# Patient Record
Sex: FEMALE | Race: WHITE | URBAN - METROPOLITAN AREA
[De-identification: names, ages, dates, MRNs, and addresses within clinical notes are randomized per-mention and may not be internally consistent; named-entity substitution may affect disease eponyms.]

---

## 2020-03-30 ENCOUNTER — HOSPITAL ENCOUNTER (OUTPATIENT)
Dept: MEDSURG UNIT | Facility: HOSPITAL | Age: 30
End: 2020-03-31
Attending: INTERNAL MEDICINE | Admitting: INTERNAL MEDICINE

## 2020-03-30 LAB
ABS NEUT (OLG): 5.2 X10(3)/MCL (ref 1.5–6.9)
ALBUMIN SERPL-MCNC: 4.2 GM/DL (ref 3.4–5)
ALBUMIN/GLOB SERPL: 1.2 RATIO
ALP SERPL-CCNC: 68 UNIT/L (ref 30–113)
ALT SERPL-CCNC: 25 UNIT/L (ref 10–45)
AMPHET UR QL SCN: NORMAL
APAP SERPL-MCNC: 0 MCG/ML (ref 10–30)
APPEARANCE, UA: CLEAR
AST SERPL-CCNC: 13 UNIT/L (ref 15–37)
B-HCG SERPL QL: NEGATIVE
BARBITURATE SCN PRESENT UR: NORMAL
BASOPHILS # BLD AUTO: 0 X10(3)/MCL (ref 0–0.1)
BASOPHILS NFR BLD AUTO: 0 % (ref 0–1)
BENZODIAZ UR QL SCN: NORMAL
BILIRUB SERPL-MCNC: 0.5 MG/DL (ref 0.1–0.9)
BILIRUB UR QL STRIP: NEGATIVE
BILIRUBIN DIRECT+TOT PNL SERPL-MCNC: 0.2 MG/DL (ref 0–0.3)
BILIRUBIN DIRECT+TOT PNL SERPL-MCNC: 0.3 MG/DL
BUN SERPL-MCNC: 8 MG/DL (ref 10–20)
CALCIUM SERPL-MCNC: 8.5 MG/DL (ref 8–10.5)
CANNABINOIDS UR QL SCN: NORMAL
CHLORIDE SERPL-SCNC: 106 MMOL/L (ref 100–108)
CO2 SERPL-SCNC: 28 MMOL/L (ref 21–35)
COCAINE UR QL SCN: NORMAL
COLOR UR: YELLOW
CREAT SERPL-MCNC: 0.92 MG/DL (ref 0.7–1.3)
EOSINOPHIL # BLD AUTO: 0.1 X10(3)/MCL (ref 0–0.6)
EOSINOPHIL NFR BLD AUTO: 1 % (ref 0–5)
ERYTHROCYTE [DISTWIDTH] IN BLOOD BY AUTOMATED COUNT: 12.8 % (ref 11.5–17)
ETHANOL SERPL-MCNC: 252 MG/DL (ref 0–10)
GLOBULIN SER-MCNC: 3.4 GM/DL
GLUCOSE (UA): NEGATIVE
GLUCOSE SERPL-MCNC: 92 MG/DL (ref 75–116)
HCT VFR BLD AUTO: 45.2 % (ref 36–48)
HGB BLD-MCNC: 14.7 GM/DL (ref 12–16)
HGB UR QL STRIP: NEGATIVE
IMM GRANULOCYTES # BLD AUTO: 0.02 10*3/UL (ref 0–0.02)
IMM GRANULOCYTES NFR BLD AUTO: 0.3 % (ref 0–0.43)
KETONES UR QL STRIP: NEGATIVE
LEUKOCYTE ESTERASE UR QL STRIP: NEGATIVE
LYMPHOCYTES # BLD AUTO: 2 X10(3)/MCL (ref 0.5–4.1)
LYMPHOCYTES NFR BLD AUTO: 25 % (ref 15–40)
MCH RBC QN AUTO: 31 PG (ref 27–34)
MCHC RBC AUTO-ENTMCNC: 32 GM/DL (ref 31–36)
MCV RBC AUTO: 96 FL (ref 80–99)
MDMA UR QL SCN: NORMAL
METHADONE UR QL SCN: NORMAL
MONOCYTES # BLD AUTO: 0.5 X10(3)/MCL (ref 0–1.1)
MONOCYTES NFR BLD AUTO: 7 % (ref 4–12)
NEUTROPHILS # BLD AUTO: 5.2 X10(3)/MCL (ref 1.5–6.9)
NEUTROPHILS NFR BLD AUTO: 66 % (ref 43–75)
NITRITE UR QL STRIP: NEGATIVE
OPIATES UR QL SCN: NORMAL
PCP UR QL: NORMAL
PH UR STRIP.AUTO: 6 [PH] (ref 5–8)
PH UR STRIP: 6 [PH]
PLATELET # BLD AUTO: 219 X10(3)/MCL (ref 140–400)
PMV BLD AUTO: 10.4 FL (ref 6.8–10)
POTASSIUM SERPL-SCNC: 3.4 MMOL/L (ref 3.6–5.2)
PROT SERPL-MCNC: 7.6 GM/DL (ref 6.4–8.2)
PROT UR QL STRIP: NEGATIVE
RBC # BLD AUTO: 4.69 X10(6)/MCL (ref 4.2–5.4)
SALICYLATES SERPL-MCNC: 3.9 MG/DL (ref 2.8–20)
SODIUM SERPL-SCNC: 142 MMOL/L (ref 135–145)
SP GR UR STRIP: <=1.005
TEMPERATURE, URINE (OHS): 23 DEGC (ref 20–25)
TSH SERPL-ACNC: 0.19 MIU/ML (ref 0.36–3.74)
UROBILINOGEN UR STRIP-ACNC: 0.2 EU/DL
WBC # SPEC AUTO: 7.8 X10(3)/MCL (ref 4.5–11.5)

## 2020-08-06 ENCOUNTER — HISTORICAL (OUTPATIENT)
Dept: ADMINISTRATIVE | Facility: HOSPITAL | Age: 30
End: 2020-08-06

## 2020-08-09 LAB — FINAL CULTURE: NORMAL

## 2021-08-28 ENCOUNTER — HISTORICAL (OUTPATIENT)
Dept: ADMINISTRATIVE | Facility: HOSPITAL | Age: 31
End: 2021-08-28

## 2021-08-30 LAB — FINAL CULTURE: NO GROWTH

## 2022-04-30 NOTE — ED PROVIDER NOTES
Patient:   Kinga Weaver            MRN: [id number]            FIN: [id number]               Age:   29 years     Sex:  Female     :  1990   Associated Diagnoses:   Overdose; Polysubstance abuse; Intentional ingestion - overdose; Alcohol intoxication   Author:   Marichuy Bahena      Basic Information   Time seen: Date & time 3/30/2020 19:05:00.   History source: Patient.   Arrival mode: Ambulance.   History limitation: None.   Additional information: Chief Complaint from Nursing Triage Note : Chief Complaint   3/30/2020 19:04 CDT      Chief Complaint           Xanax overdose, took approx 20 unknown strength of Xanaxz.  Unknown exact time of ingestion, possibly 45 min PTA.  .      History of Present Illness   The patient presents with intentional overdose and suicide attempt.  The substance ingested was xanax.  The onset was 30  minutes ago 45  minutes ago .  Amount ingested: substance xanax, strength 0.5 and number taken 20.  The location where the incident occurred was at home.  The reason for ingestion was suicide attempt.  Risk factors consist of prior suicide attempt none .  Prior episodes: occasional none .  Therapy today: none.  Associated symptoms: confusion none .  Additional self injury: none.  Additional history: none.        Review of Systems    Constitutional symptoms:  Negative except as documented in HPI.        Skin symptoms:  Negative except as documented in HPI.        Eye symptoms:  Negative except as documented in HPI.        ENMT symptoms:  Negative except as documented in HPI.        Respiratory symptoms:  Negative except as documented in HPI.        Cardiovascular symptoms:  Negative except as documented in HPI.        Gastrointestinal symptoms:  Negative except as documented in HPI.        Genitourinary symptoms:  Negative except as documented in HPI.        Musculoskeletal symptoms:  Negative except as documented in HPI.        Neurologic symptoms:  Negative except as  documented in HPI.        Psychiatric symptoms:  suicide attempt, substance abuse.         Endocrine symptoms:  Negative except as documented in HPI.        Hematologic/Lymphatic symptoms:  Negative except as documented in HPI.        Allergy/immunologic symptoms:  Negative except as documented in HPI.                 Additional review of systems information: Unable to obtain due to: Clinical condition.              Additional review of systems information: All other systems reviewed and otherwise negative.          Health Status   Allergies:    Allergies (1) Active Reaction  No Known Allergies None Documented  .   Medications:  (Selected)   Inpatient Medications  Ordered  Normal Saline Infusion 1,000 mL: 1,000 mL, 1,000 mL, IV, 250 mL/hr, start date 03/30/20 19:06:00 CDT, 1.62, m2  albuterol 2.5 mg/3 mL (0.083%) inhalation solution: 2.5 mg, form: Soln-Inh, INH, Once-NOW, first dose 01/14/18 16:16:00 CST, stop date 01/14/18 16:16:00 CST, [phone],[id number]  Prescriptions  Prescribed  Vistaril 50 mg oral capsule: 50 mg = 1 cap(s), Oral, q8hr, PRN PRN anxiety, # 60 cap(s), 0 Refill(s)  citalopram 20 mg oral tablet: 20 mg = 1 tab(s), Oral, Daily, # 30 tab(s), 1 Refill(s)  doxycycline hyclate 100 mg oral tablet: 100 mg = 1 tab(s), Oral, BID, # 10 tab(s), 0 Refill(s)  metronidazole 500 mg oral tablet: 500 mg = 1 tab(s), Oral, TID, # 6 tab(s), 0 Refill(s)  traZODone 100 mg oral tablet: 100 mg = 1 tab(s), Oral, Once a day (at bedtime), # 30 tab(s), 1 Refill(s)  Documented Medications  Documented  Amoxil 500 mg Cap: 500 mg = 1 cap(s), Oral, TID  Symbicort 80 mcg-4.5 mcg/inh inhalation aerosol: 2 puff(s), INH, BID  alPRAzolam 0.5 mg oral tablet: 0.5 mg = 1 tab(s), Oral, TID  albuterol CFC free 90 mcg/inh inhalation aerosol with adapter: 2 puff(s), INH, QID  ibuprofen 800 mg oral tablet: 800 mg = 1 tab(s), Oral, TID  mirtazapine 15 mg oral tablet: 15 mg = 1 tab(s), Oral, qPM  prednisONE 20 mg oral tablet: 20 mg = 1 tab(s), Oral, Daily.    Immunizations: Include Immunizations   Previous  influenza virus vaccine, inactivated: Ad hoc dose (inflSusquad) 12/4/2017 CST.  tetanus/diphtheria/pertussis, acel(Tdap): Ad hoc dose (tetaSus) 1/12/2017 CST.  Future  No future immunizations have been selected or recorded. .      Past Medical/ Family/ Social History   Medical history:    Resolved  Alcohol withdrawal syndrome (SNOMED CT 214395802):  Resolved.  Suicidal thoughts (SNOMED CT 805096769):  Resolved.  Tearfulness (SNOMED CT 102077385):  Resolved., Reviewed as documented in chart.   Surgical history:    Tubal ligation ([id number]) in 2014 at 24 Years., Reviewed as documented in chart.   Family history:    Father  Hypertension.  Cataract.  Asthma.  Mother  Alcoholism.  Drug addiction.  Sister  Bipolar  Grandmother  Metastatic cancer  COPD (chronic obstructive pulmonary disease).  , Reviewed as documented in chart.   Social history: Reviewed as documented in chart.   Problem list:    Active Problems (8)  Anxiety   Depression   Detoxification   Fall risk   Ineffective coping (individual)   Knowledge deficit   Polysubstance abuse   Tobacco user   .      Physical Examination               Vital Signs   Vital Signs   3/30/2020 19:04 CDT      Temperature Temporal Artery               36.2 DegC  LOW                             Peripheral Pulse Rate     96 bpm                             Respiratory Rate          26 br/min  HI                             SpO2                      100 %                             Oxygen Therapy            Room air                             Systolic Blood Pressure   117 mmHg                             Diastolic Blood Pressure  83 mmHg  .   General:  obtunded    General:  Alert, no acute distress.        Stephon coma scale:  Per nurse's notes.   Neurological:  No focal neurological deficit observed    Neurological:  Alert and oriented to person, place, time, and situation, No focal neurological deficit observed, normal speech  observed.        Skin:  Warm, intact, normal for ethnicity.    Head:  Normocephalic, atraumatic.    Neck:  Supple, trachea midline.    Eye:  pupils dilated bilaterally, equal, minimal response to light    Eye:  Extraocular movements are intact, normal conjunctiva, vision grossly normal.        Ears, nose, mouth and throat:  Oral mucosa moist.   Cardiovascular:  Regular rate and rhythm, No edema.    Respiratory:  Lungs are clear to auscultation, breath sounds are equal, hypopnea respirations are non-labored    Chest wall:  No tenderness, No deformity.    Musculoskeletal:  Normal ROM, no deformity.    Gastrointestinal:  Soft, Nontender, Non distended, Normal bowel sounds.    Lymphatics:  No lymphadenopathy.   Psychiatric:  Mood and affect:  depressed , Judgment: Impaired by (intoxication, abnormal thoughts), Abnormal / Psychotic thoughts: Suicidal.       Medical Decision Making   Differential Diagnosis:  Intentional overdose, suicide attempt, suicide gesture, alcohol intoxication, drug abuse, anxiety, psychosis.    Documents reviewed:  Emergency department nurses' notes, emergency department records, prior records.    Orders  Launch Order Profile (Selected)   Inpatient Orders  Ordered  Normal Saline Infusion 1,000 mL: 1,000 mL, 1,000 mL, IV, 250 mL/hr, start date 03/30/20 19:06:00 CDT, 1.62, m2  Orogastric/Nasogastric Tube Insertion: 03/30/20 19:10:00 CDT  Ordered (Dispatched)  Acetaminophen Level: Stat collect, 03/30/20 19:05:00 CDT, Blood, Stop date 03/30/20 19:05:00 CDT, Lab Collect, 03/30/20 19:05:00 CDT  CBC w/ Auto Diff: Stat collect, 03/30/20 19:05:00 CDT, Blood, Stop date 03/30/20 19:05:00 CDT, Lab Collect, 03/30/20 19:05:00 CDT  CMP: Stat collect, 03/30/20 19:05:00 CDT, Blood, Stop date 03/30/20 19:05:00 CDT, Lab Collect, 03/30/20 19:05:00 CDT  Drug Screen Urine AGH: Stat collect, Urine, 03/30/20 19:05:00 CDT, Stop date 03/30/20 19:05:00 CDT, Nurse collect  EtOH Level: Stat collect, 03/30/20 19:05:00 CDT,  Blood, Stop date 03/30/20 19:05:00 CDT, Lab Collect, 03/30/20 19:05:00 CDT  Salicylate Level: Stat collect, 03/30/20 19:05:00 CDT, Blood, Stop date 03/30/20 19:05:00 CDT, Lab Collect, 03/30/20 19:05:00 CDT  TSH: Stat collect, 03/30/20 19:05:00 CDT, Blood, Stop date 03/30/20 19:05:00 CDT, Lab Collect, 03/30/20 19:05:00 CDT  UPT - Urine Pregancy Test: Stat collect, Urine, 03/30/20 19:05:00 CDT, Stop date 03/30/20 19:05:00 CDT, Nurse collect, if Premenopausal and NO Hysterectomy, 03/30/20 19:05:00 CDT  Urinalysis with Microscopic if Indicated: Stat collect, Urine, 03/30/20 19:05:00 CDT, Stop date 03/30/20 19:05:00 CDT, Nurse collect.   Results review:  Lab results : Lab View   3/30/2020 19:44 CDT      U Beta hCG Ql             Negative                             U pH                      6.0                             UA Appear                 Clear                             UA Color                  Yellow                             UA Spec Grav              <=1.005                             UA Bili                   Negative                             UA pH                     6.0  NA                             UA Urobilinogen           0.2 EU/dL                             UA Blood                  Negative                             UA Glucose                Negative                             UA Ketones                Negative                             UA Protein                Negative                             UA Nitrite                Negative                             UA Leuk Est               Negative                             U Temp                    23.0 DegC                             U Amph Scr                NEG.                             U Unique Scr                NEG.                             U Benzodia Scr            POS.                             U Cannab Scr              NEG.                             U Cocaine Scr             NEG.                             U Opiate  Scr              NEG.                             U Phencyclidine Scr       NEG.                             U Methadone               NEG.                             U MDMA Scr                NEG.    3/30/2020 19:31 CDT      Sodium Lvl                142 mmol/L                             Potassium Lvl             3.4 mmol/L  LOW                             Chloride                  106 mmol/L                             CO2                       28 mmol/L                             Calcium Lvl               8.5 mg/dL                             Glucose Lvl               92 mg/dL                             BUN                       8 mg/dL  LOW                             Creatinine                0.92 mg/dL                             eGFR-AA                   >60 mL/min/1.73 m2  NA                             eGFR-JHON                  >60 mL/min/1.73 m2  NA                             Bili Total                0.5 mg/dL                             Bili Direct               0.20 mg/dL                             Bili Indirect             0.30 mg/dL  NA                             AST                       13 unit/L  LOW                             ALT                       25 unit/L                             Alk Phos                  68 unit/L                             Total Protein             7.6 gm/dL                             Albumin Lvl               4.2 gm/dL                             Globulin                  3.40 gm/dL  NA                             A/G Ratio                 1.2 ratio  NA                             TSH                       0.186 mIU/mL  LOW                             WBC                       7.8 x10(3)/mcL                             RBC                       4.69 x10(6)/mcL                             Hgb                       14.7 gm/dL                             Hct                       45.2 %                             Platelet                  219 x10(3)/mcL                              MCV                       96 fL                             MCH                       31 pg                             MCHC                      32 gm/dL                             RDW                       12.8 %                             MPV                       10.4 fL  HI                             Abs Neut                  5.2 x10(3)/mcL                             Neutro Auto               66 %                             Lymph Auto                25 %                             Mono Auto                 7 %                             Eos Auto                  1 %                             Abs Eos                   0.1 x10(3)/mcL                             Basophil Auto             0 %                             Abs Neutro                5.2 x10(3)/mcL                             Abs Lymph                 2.0 x10(3)/mcL                             Abs Mono                  0.5 x10(3)/mcL                             Abs Baso                  0.0 x10(3)/mcL                             IG%                       0.300 %                             IG#                       0.0200  HI                             Acetaminoph Lvl           0.0 mcg/mL  LOW                             Salicylate Lvl            3.9 mg/dL                             Ethanol Lvl               252 mg/dL  HI    ,    No qualifying data available.       Reexamination/ Reevaluation   2120:  Called and spoke to Edenilson GAONA for ER to ER transfer due to no icu beds at [facility].   [facility] ER physician contacted the [facility] and he called me back and stated that he could not accept her because she may become too much of an inconvenience.        Time: 3/30/2020 22:45:00 .   Vital signs   results included from flowsheet : Vital Signs   3/30/2020 23:03 CDT      Temperature Oral          36.8 DegC                             Temperature Oral (calculated)             98.24 DegF                              "Peripheral Pulse Rate     82 bpm                             Heart Rate Monitored      82 bpm                             SpO2                      97 %                             Oxygen Therapy            Room air                             Systolic Blood Pressure   106 mmHg                             Diastolic Blood Pressure  73 mmHg                             Mean Arterial Pressure, Cuff              84 mmHg     Course: improving.   Pain status: unchanged.   Assessment: exam improved, she is now more awake,  calm, follow commands.      Procedure   Critical care note   Total time: 45 minutes spent engaged in work directly related to patient care and/ or available for direct patient care.   Critical condition(s) addressed for impending deterioration include: airway, respiratory, cardiovascular, central nervous system.   Associated risk factors: hypotension, shock, hypoxia, metabolic changes, drug or med overdose.   Management: bedside assessment, supervision of care, Interpretation (electrocardiogram, blood pressure, cardiac output measures), Interventions (hemodynamic management, vascular access), Case review nursing, Alternate history emergency medical services.   Performed by: self.      Impression and Plan   Diagnosis   Overdose (NKK15-HR T50.901A)   Polysubstance abuse (RUJ61-XN F19.10)   Intentional ingestion - overdose (PNED [id number])   Alcohol intoxication (BTU49-AQ F10.929)   Plan   Condition   Disposition: Admit time  3/30/2020 23:15:00, Place in Observation Unit, spoke with jay Moreno to admit for observation prior to being sent to Psych.    Orders: Launch Orders   Admit/Transfer/Discharge:  Place in Outpatient Observation (Order): 3/30/2020 23:15 CDT, Telemetry with Monitor Sherri DIAZ, Dwight, No  .    Notes: "Marichuy LIMON [facility], am scribing for and in the presence of Dr. Bush."   3/30/2020 1905  .       Addendum   REHANA LIMON MD, personally performed the " services described in this documentation, as scribed in my presence, and it is both accurate and complete.   Marichuy Bahena acted as scribe and she is a certified MA who was present during entire interaction with this patient.

## 2022-05-03 NOTE — HISTORICAL [facility] CERNER
This is a historical note converted from [employer]. Formatting and pictures may have been removed.  Please reference [employer] for original formatting and attached multimedia. Admit and Discharge Dates  Admit Date: 03/30/2020  Discharge Date: 03/31/2020  Physicians  Attending Physician - Sherri DIAZ, Dwight  Admitting Physician - Sherri DIAZ, Dwight  Primary Care Physician - MARIBELL Mandel III, Jimmy  Discharge Diagnosis  1.?Intentional drug overdose?T50.902A  2.?Alcohol intoxication?F10.929  3.?Hypokalemia?E87.6  4.?Depression?F32.9  Intentional ingestion - overdose?[id number]  Overdose?T50.901A  Polysubstance abuse?F19.10  Surgical Procedures  No procedures recorded for this visit.  Immunizations  No immunizations recorded for this visit.  Admission Information  Xanax overdose, took approx 20 unknown strength of Xanaxz. ?Unknown exact time of ingestion, possibly 45 min PTA.  History of Present Illness  Ms. Weaver is a 30 y/o WF with a h/o depression, anxiety,?and suicide attempt who was transported to the ER today following an intentional drug overdose.  According to the?ER records, she took?20 0.5 mg tablets of xanax earlier tonight in an attempt to commit suicide. On arrival to the ER she was hypotensive with a blood pressure of 94/66 and her initial labwork was remarkable for a potassium of 3.4 and a serum alcohol level of 252. Her urine drug screen was positive for benzodiazepines and she was given flumazenil 0.2 mg IV and 0.3 mg IV?and a 2 liter normal saline bolus in the ER. She became agitated afterwards and she received haldol 10 mg IM x 1 in the ER.???She is otherwise in stable condition and unable to verbalize any complaints.  this am pt is awake and alert, NGT is in place with minimal output.? She denies any SI/SA and?jasont give me a reason that she took 20 pills.? She denied any prior h/o SI/SA, but her hx per ER and TELEdoc on call last night indicate she has? a h/o?SA.? She is not  being completely honest.?  We?clamped her?NGT and will remove and give her clear liuids if?she tolerates it this am.  Case managemetn consulted for psyche?referral once medically cleared. [1]  Hospital Course  pt meds wore off and she is now awake, NGT was removed and she is tolerating diet, pt was accepted for psyche placement in [place] and will be d/c there  Time Spent on discharge  35 min  Objective  Vitals & Measurements  T:?36.8? ?C (Oral)? TMIN:?36.2? ?C (Temporal Artery)? TMAX:?36.8? ?C (Oral)? HR:?89(Peripheral)? RR:?17? BP:?114/73? SpO2:?95%? WT:?59?kg?  Physical Exam  General:?well-developed well-nourished in no acute distress  Respiratory:?clear to auscultation bilaterally  Cardiovascular:?regular rate and rhythm without murmurs, gallops or rubs  Gastrointestinal:?soft, non-tender, non-distended with normal bowel sounds, without masses to palpation  Genitourinary: no CVA tenderness to palpation  Musculoskeletal:?full range of motion of all extremities/spine without limitation or discomfort  Integumentary: no rashes or skin lesions present  Neurologic: cranial nerves intact, no signs of peripheral neurological deficit, motor/sensory function intact  Patient Discharge Condition  stable  Discharge Disposition  to psyche facilty   Discharge Medication Reconciliation  Discontinue  alPRAzolam (alPRAzolam 0.5 mg oral tablet)?0.5 mg, Oral, TID  albuterol (albuterol CFC free 90 mcg/inh inhalation aerosol with adapter)?2 puff(s), INH, QID  amoxicillin (Amoxil 500 mg Cap)?500 mg, Oral, TID  azithromycin (azithromycin 500 mg oral tablet)?500 mg, Oral, Daily  budesonide-formoterol (Symbicort 80 mcg-4.5 mcg/inh inhalation aerosol)?2 puff(s), INH, BID  citalopram (citalopram 20 mg oral tablet)?20 mg, Oral, Daily  dextromethorphan-promethazine (dextromethorphan-promethazine 15 mg-6.25 mg/5 mL oral syrup)?5 mL, Oral, q6hr  doxycycline (doxycycline hyclate 100 mg oral tablet)?100 mg, Oral, BID  hydrOXYzine (Vistaril 50  mg oral capsule)?50 mg, Oral, q8hr, PRN anxiety  ibuprofen (ibuprofen 800 mg oral tablet)?800 mg, Oral, TID  methocarbamol (methocarbamol 750 mg oral tablet)?1500 mg, Oral, TID  metroNIDAZOLE (metronidazole 500 mg oral tablet)?500 mg, Oral, TID  mirtazapine (mirtazapine 15 mg oral tablet)?15 mg, Oral, qPM  predniSONE (prednisONE 20 mg oral tablet)?20 mg, Oral, Daily  traZODone (traZODone 100 mg oral tablet)?100 mg, Oral, Once a day (at bedtime)  Education and Orders Provided  Intentional Drug Overdose  Discharge - 03/31/20 18:34:00 CDT, Discharge to Psychiatric Facility, Give all scheduled vaccinations prior to discharge.?  Discharge Activity - Activity as Tolerated?  Discharge Diet - Regular?  Follow up  Tequila VARGAS, MARIBELL, Laci     [1]?Admission H & P; Ruma DIAZ, Gladys 03/31/2020 12:36 CDT

## 2022-05-03 NOTE — HISTORICAL [facility] CERNER
This is a historical note converted from [employer]. Formatting and pictures may have been removed.  Please reference [employer] for original formatting and attached multimedia. Chief Complaint  Xanax overdose, took approx 20 unknown strength of Xanaxz. ?Unknown exact time of ingestion, possibly 45 min PTA.  History of Present Illness  Ms. Weaver is a 30 y/o WF with a h/o depression, anxiety,?and suicide attempt who was transported to the ER today following an intentional drug overdose.  According to the?ER records, she took?20 0.5 mg tablets of xanax earlier tonight in an attempt to commit suicide. On arrival to the ER she was hypotensive with a blood pressure of 94/66 and her initial labwork was remarkable for a potassium of 3.4 and a serum alcohol level of 252. Her urine drug screen was positive for benzodiazepines and she was given flumazenil 0.2 mg IV and 0.3 mg IV?and a 2 liter normal saline bolus in the ER. She became agitated afterwards and she received haldol 10 mg IM x 1 in the ER.???She is otherwise in stable condition and unable to verbalize any complaints.  this am pt is awake and alert, NGT is in place with minimal output.? She denies any SI/SA and?hernán give me a reason that she took 20 pills.? She denied any prior h/o SI/SA, but her hx per ER and TELEdoc on call last night indicate she has? a h/o?SA.? She is not being completely honest.?  We?clamped her?NGT and will remove and give her clear liuids if?she tolerates it this am.  Case managemetn consulted for psyche?referral once medically cleared.  Review of Systems  Constitutional:?no fever, fatigue, weakness  Eye:?no vision loss, eye redness, drainage, or pain  ENMT:?no sore throat, ear pain, sinus pain/congestion, nasal congestion/drainage  Respiratory:?no cough, no wheezing, no shortness of breath  Cardiovascular:?no chest pain, no palpitations, no edema  Gastrointestinal:?no nausea, vomiting, or diarrhea. No abdominal pain  Genitourinary:?no  dysuria, no urinary frequency or urgency, no hematuria  Hema/Lymph:?no abnormal bruising or bleeding  Endocrine:?no heat or cold intolerance, no excessive thirst or excessive urination  Musculoskeletal:?no muscle or joint pain, no joint swelling  Integumentary:?no skin rash or abnormal lesion  Neurologic: no headache, no dizziness, no weakness or numbness  ?  Physical Exam  Vitals & Measurements  T:?36.4? ?C (Oral)? TMIN:?36.2? ?C (Temporal Artery)? TMAX:?36.8? ?C (Oral)? HR:?86(Peripheral)? RR:?16? BP:?108/69? SpO2:?96%? WT:?59?kg?  General:?well-developed well-nourished in no acute distress  Eye: PERRLA, EOMI, clear conjunctiva, eyelids normal  HENT:?oropharynx and nasal mucosal surfaces moist, no maxillary/frontal sinus tenderness to palpation, NGT in place.  Neck: full range of motion, no thyromegaly or lymphadenopathy  Respiratory:?clear to auscultation bilaterally  Cardiovascular:?regular rate and rhythm without murmurs, gallops or rubs  Gastrointestinal:?soft, non-tender, non-distended with normal bowel sounds, without masses to palpation  Genitourinary: no CVA tenderness to palpation  Musculoskeletal:?full range of motion of all extremities/spine without limitation or discomfort  Integumentary: no rashes or skin lesions present  Neurologic: cranial nerves intact, no signs of peripheral neurological deficit, motor/sensory function intact  ?  Assessment/Plan  1.?Intentional drug overdose?T50.902A  ?will need psyche eval  current PEC expires 10pm on 04/02  will need CEC at that time if no bed available prior to that  2.?Alcohol intoxication?F10.929  imrpoved  3.?Hypokalemia?E87.6  improved  4.?Depression?F32.9  defer to psyche for treatment  Orders:  Clear Liquid Diet, 03/31/20 11:34:00 CDT, Start Meal: Now  Consult Case Management, 03/31/20 11:02:00 CDT  Gastric/Enteral Tube Discontinue, 03/31/20 11:34:00 CDT   Problem List/Past Medical History  Ongoing  Anxiety  Depression  Detoxification  Polysubstance  abuse  Tobacco user  Historical  Alcohol withdrawal syndrome  Suicidal thoughts  Tearfulness  Procedure/Surgical History  Detoxification Services for Substance Abuse Treatment (07/24/2019)  Tubal ligation (2014)   Medications  Inpatient  acetaminophen, 650 mg= 2 tab(s), Oral, q4hr, PRN  albuterol 2.5 mg/3 mL (0.083%) inhalation solution, 2.5 mg= 3 mL, INH, Once-NOW  Benadryl, 50 mg= 1 mL, IM, q8hr, PRN  Haldol, 5 mg= 1 mL, IM, q4hr, PRN  ibuprofen, 400 mg= 1 tab(s), Oral, q6hr, PRN  Milk of Magnesia, 30 mL, Oral, Daily, PRN  Normal Saline Infusion 1,000 mL, 1000 mL, IV  Sodium Chloride 0.9% intravenous solution 1,000 mL, 1000 mL, IV  ZyPREXA, 5 mg= 1 tab(s), Oral, TID, PRN  Home  albuterol CFC free 90 mcg/inh inhalation aerosol with adapter, 2 puff(s), INH, QID,? ?Investigating  alPRAzolam 0.5 mg oral tablet, 0.5 mg= 1 tab(s), Oral, TID,? ?Investigating  Amoxil 500 mg Cap, 500 mg= 1 cap(s), Oral, TID,? ?Not taking  azithromycin 500 mg oral tablet, 500 mg= 1 tab(s), Oral, Daily,? ?Investigating  citalopram 20 mg oral tablet, 20 mg= 1 tab(s), Oral, Daily, 1 refills,? ?Not taking  dextromethorphan-promethazine 15 mg-6.25 mg/5 mL oral syrup, 5 mL, Oral, q6hr,? ?Investigating  doxycycline hyclate 100 mg oral tablet, 100 mg= 1 tab(s), Oral, BID,? ?Not Taking, Completed Rx  ibuprofen 800 mg oral tablet, 800 mg= 1 tab(s), Oral, TID,? ?Not Taking, Completed Rx  methocarbamol 750 mg oral tablet, 1500 mg= 2 tab(s), Oral, TID,? ?Investigating  metronidazole 500 mg oral tablet, 500 mg= 1 tab(s), Oral, TID,? ?Not Taking, Completed Rx  mirtazapine 15 mg oral tablet, 15 mg= 1 tab(s), Oral, qPM,? ?Not Taking, Completed Rx  prednisONE 20 mg oral tablet, 20 mg= 1 tab(s), Oral, Daily,? ?Not Taking, Completed Rx  Symbicort 80 mcg-4.5 mcg/inh inhalation aerosol, 2 puff(s), INH, BID,? ?Investigating  traZODone 100 mg oral tablet, 100 mg= 1 tab(s), Oral, Once a day (at bedtime), 1 refills,? ?Not taking  Vistaril 50 mg oral capsule, 50 mg=  1 cap(s), Oral, q8hr, PRN,? ?Not taking  Allergies  No Known Allergies  Social History  Abuse/Neglect  No, 03/31/2020  No, 03/30/2020  No, 03/12/2020  No, 03/12/2020  No, 12/31/2019  Alcohol  Current, Beer, 1-2 times per week, 01/12/2017  Home/Environment  Living situation: Home/Independent., 01/12/2017  Substance Use  Prescription medications, Kait and Xanax, Daily, Drug use interferes with work/home: Yes. Household substance abuse concerns: No., 07/24/2019  Past, 08/27/2018  Tobacco  10 or more cigarettes (1/2 pack or more)/day in last 30 days, N/A, 03/31/2020  10 or more cigarettes (1/2 pack or more)/day in last 30 days, No, 03/30/2020  10 or more cigarettes (1/2 pack or more)/day in last 30 days, N/A, 03/12/2020  10 or more cigarettes (1/2 pack or more)/day in last 30 days, No, 03/12/2020  10 or more cigarettes (1/2 pack or more)/day in last 30 days, No, 12/31/2019  Family History  Alcoholism.: Mother.  Asthma.: Father.  Bipolar: Sister.  COPD (chronic obstructive pulmonary disease).: Grandmother.  Cataract.: Father.  Drug addiction.: Mother.  Hypertension.: Father.  Metastatic cancer: Grandmother.  Immunizations  Vaccine Date Status   influenza virus vaccine, inactivated 12/04/2017 Given   tetanus/diphtheria/pertussis, acel(Tdap) 01/12/2017 Given   Lab Results  Labs Last 24 Hours?  ?Chemistry? Hematology/Coagulation?   Sodium Lvl: 142 mmol/L (03/30/20 19:31:00) WBC: 7.8 x10(3)/mcL (03/30/20 19:31:00)   Potassium Lvl:?3.4 mmol/L?Low (03/30/20 19:31:00) RBC: 4.69 x10(6)/mcL (03/30/20 19:31:00)   Chloride: 106 mmol/L (03/30/20 19:31:00) Hgb: 14.7 gm/dL (03/30/20 19:31:00)   CO2: 28 mmol/L (03/30/20 19:31:00) Hct: 45.2 % (03/30/20 19:31:00)   Calcium Lvl: 8.5 mg/dL (03/30/20 19:31:00) Platelet: 219 x10(3)/mcL (03/30/20 19:31:00)   Glucose Lvl: 92 mg/dL (03/30/20 19:31:00) MCV: 96 fL (03/30/20 19:31:00)   BUN:?8 mg/dL?Low (03/30/20 19:31:00) MCH: 31 pg (03/30/20 19:31:00)   Creatinine: 0.92 mg/dL (03/30/20 19:31:00)  MCHC: 32 gm/dL (03/30/20 19:31:00)   eGFR-AA: >60 (03/30/20 19:31:00) RDW: 12.8 % (03/30/20 19:31:00)   eGFR-JHON: >60 (03/30/20 19:31:00) MPV:?10.4 fL?High (03/30/20 19:31:00)   Bili Total: 0.5 mg/dL (03/30/20 19:31:00) Abs Neut: 5.2 x10(3)/mcL (03/30/20 19:31:00)   Bili Direct: 0.2 mg/dL (03/30/20 19:31:00) Neutro Auto: 66 % (03/30/20 19:31:00)   Bili Indirect: 0.3 mg/dL (03/30/20 19:31:00) Lymph Auto: 25 % (03/30/20 19:31:00)   AST:?13 unit/L?Low (03/30/20 19:31:00) Mono Auto: 7 % (03/30/20 19:31:00)   ALT: 25 unit/L (03/30/20 19:31:00) Eos Auto: 1 % (03/30/20 19:31:00)   Alk Phos: 68 unit/L (03/30/20 19:31:00) Abs Eos: 0.1 x10(3)/mcL (03/30/20 19:31:00)   Total Protein: 7.6 gm/dL (03/30/20 19:31:00) Basophil Auto: 0 % (03/30/20 19:31:00)   Albumin Lvl: 4.2 gm/dL (03/30/20 19:31:00) Abs Neutro: 5.2 x10(3)/mcL (03/30/20 19:31:00)   Globulin: 3.4 gm/dL (03/30/20 19:31:00) Abs Lymph: 2 x10(3)/mcL (03/30/20 19:31:00)   A/G Ratio: 1.2 ratio (03/30/20 19:31:00) Abs Mono: 0.5 x10(3)/mcL (03/30/20 19:31:00)   U Beta hCG Ql: Negative (03/30/20 19:44:00) Abs Baso: 0 x10(3)/mcL (03/30/20 19:31:00)   TSH:?0.186 mIU/mL?Low (03/30/20 19:31:00) IG%: 0.3 % (03/30/20 19:31:00)   U pH: 6 (03/30/20 19:44:00) IG#:?0.02?High (03/30/20 19:31:00)